# Patient Record
Sex: FEMALE | Race: WHITE | NOT HISPANIC OR LATINO
[De-identification: names, ages, dates, MRNs, and addresses within clinical notes are randomized per-mention and may not be internally consistent; named-entity substitution may affect disease eponyms.]

---

## 2023-12-22 ENCOUNTER — NON-APPOINTMENT (OUTPATIENT)
Age: 58
End: 2023-12-22

## 2023-12-22 PROBLEM — Z00.00 ENCOUNTER FOR PREVENTIVE HEALTH EXAMINATION: Status: ACTIVE | Noted: 2023-12-22

## 2023-12-26 ENCOUNTER — NON-APPOINTMENT (OUTPATIENT)
Age: 58
End: 2023-12-26

## 2023-12-29 ENCOUNTER — APPOINTMENT (OUTPATIENT)
Dept: GYNECOLOGIC ONCOLOGY | Facility: CLINIC | Age: 58
End: 2023-12-29
Payer: COMMERCIAL

## 2023-12-29 ENCOUNTER — TRANSCRIPTION ENCOUNTER (OUTPATIENT)
Age: 58
End: 2023-12-29

## 2023-12-29 VITALS
TEMPERATURE: 97.5 F | OXYGEN SATURATION: 97 % | HEIGHT: 68 IN | WEIGHT: 161 LBS | HEART RATE: 82 BPM | DIASTOLIC BLOOD PRESSURE: 73 MMHG | BODY MASS INDEX: 24.4 KG/M2 | SYSTOLIC BLOOD PRESSURE: 135 MMHG

## 2023-12-29 PROCEDURE — 99205 OFFICE O/P NEW HI 60 MIN: CPT

## 2024-01-03 NOTE — ASSESSMENT
[FreeTextEntry1] : Could not appreciate vaginal lesion. MRI imaging reviewed. Appears to be adjacent to the cervix and is well circumscribed. Imaging reviewed with Dr. Sutherland, who agrees there is a benign appearance to the mass. Recommendation is for IR guided biopsy vs. close observation.

## 2024-01-03 NOTE — CHIEF COMPLAINT
[FreeTextEntry1] : New Consult  57yo referred by Dr. Wells for Vaginal Mass. Pt noticed streak/scant vaginal spotting x 6 months. 1 month ago, pt noticed a small blood clot and went to see Dr. Wells. Pt reports 15lbs weight gain over the course of 1 years. It has been a stressful 2 years (mothers stroke, father passed). Poor sleep for 3-4 years. No other changes in the past 6 months: no abdominal pain, N/V, fevers/chills, fatigue, malaise, good appetite.  OBGYNHX: denies, no hx STIs, no hx abnml paps (March 2023, but does think she may have been HPV+ recently), Menopause >10years ago. Not sexually active in many years. No partner. PMHX: recent HTN diagnoses in 2023, thalassemia intermediate - sees a Hematologist, triscuspid regurgitation diagnosed 2019 - sees a Cardiologist, eczema - sees Dermatologist, osteoporosis Meds: folic acid, lisinopril 2.5QD, amlodipine 10QD SX: L knee arthoscopy ALL: ASA - angioedema SOCIAL: denies smoking/drinking/drugs, home taking care of mother who had a stroke, works for [employer] - sedentary job FAM: father passed unknown cancer, paternal uncle lung CA, paternal uncle prostate CA, paternal uncle stomach CA - all over the age of 65.   Last Mammogram: March 2023 Last pap: March 2023 negative (poss HPV+, pt unsure) Last Colonoscopy: 2019, polyps removed - all pre-cancerous, next supposed to be for 2025

## 2024-01-03 NOTE — HISTORY OF PRESENT ILLNESS
[FreeTextEntry1] : Problem: 1) Vaginal Mass  Previous Therapies: 1) TVUS 12/13/23     a) 1.9 x 1.5 x 1.6cm hypoechoic mass with internal vascularity     b) Uterus: 4.7 x 2 x 2.6cm; EMS 0.2cm     c) RO 1.7cm     d) LO 2.0cm  2) Pelvic MRI 12/21/23     a) Along Left Lateral aspect of vaginal fornix there is a subtle T2 hypointense lesion 2.0 x 2.0 x1.8cm with abnormal enhancement.

## 2024-01-03 NOTE — DISCUSSION/SUMMARY
[FreeTextEntry1] : 59yo referred by Dr. Wells for vaginal/cervical mass and post-menopausal meeting. Based on physical exam, benign fibroid is a possibility. Prior to a surgical management discussion, would like to review images with radiology. Options for expectant management, biopsy for diagnosis, and radical hysterectomy were briefly mentioned during this visit. Velez discussion deferred pending discussion with Dr. Sutherland and IR.   [x] Reviewed imaging results with Dr. Sutherland who agrees mass looks benign.   [ ] plan to f/u with patient today or early next week regarding recommendation for next steps [ ] possible IR consult regarding possible biopsy

## 2024-01-04 DIAGNOSIS — N89.8 OTHER SPECIFIED NONINFLAMMATORY DISORDERS OF VAGINA: ICD-10-CM

## 2024-01-08 ENCOUNTER — APPOINTMENT (OUTPATIENT)
Dept: INTERVENTIONAL RADIOLOGY/VASCULAR | Facility: HOSPITAL | Age: 59
End: 2024-01-08

## 2024-01-08 PROCEDURE — XXXXX: CPT | Mod: 1L
